# Patient Record
Sex: MALE | Race: WHITE | URBAN - METROPOLITAN AREA
[De-identification: names, ages, dates, MRNs, and addresses within clinical notes are randomized per-mention and may not be internally consistent; named-entity substitution may affect disease eponyms.]

---

## 2019-11-17 ENCOUNTER — EMERGENCY (EMERGENCY)
Facility: HOSPITAL | Age: 34
LOS: 1 days | Discharge: HOME | End: 2019-11-17
Admitting: EMERGENCY MEDICINE
Payer: COMMERCIAL

## 2019-11-17 VITALS
HEIGHT: 72 IN | HEART RATE: 89 BPM | TEMPERATURE: 98 F | DIASTOLIC BLOOD PRESSURE: 111 MMHG | SYSTOLIC BLOOD PRESSURE: 163 MMHG | OXYGEN SATURATION: 100 % | WEIGHT: 250 LBS | RESPIRATION RATE: 18 BRPM

## 2019-11-17 VITALS — DIASTOLIC BLOOD PRESSURE: 62 MMHG | SYSTOLIC BLOOD PRESSURE: 133 MMHG

## 2019-11-17 LAB
ALBUMIN SERPL ELPH-MCNC: 4 G/DL — SIGNIFICANT CHANGE UP (ref 3.4–5)
ALP SERPL-CCNC: 51 U/L — SIGNIFICANT CHANGE UP (ref 40–120)
ALT FLD-CCNC: 53 U/L — HIGH (ref 12–42)
ANION GAP SERPL CALC-SCNC: 10 MMOL/L — SIGNIFICANT CHANGE UP (ref 9–16)
AST SERPL-CCNC: 24 U/L — SIGNIFICANT CHANGE UP (ref 15–37)
BASOPHILS # BLD AUTO: 0.06 K/UL — SIGNIFICANT CHANGE UP (ref 0–0.2)
BASOPHILS NFR BLD AUTO: 0.8 % — SIGNIFICANT CHANGE UP (ref 0–2)
BILIRUB SERPL-MCNC: 0.5 MG/DL — SIGNIFICANT CHANGE UP (ref 0.2–1.2)
BUN SERPL-MCNC: 19 MG/DL — SIGNIFICANT CHANGE UP (ref 7–23)
CALCIUM SERPL-MCNC: 8.8 MG/DL — SIGNIFICANT CHANGE UP (ref 8.5–10.5)
CHLORIDE SERPL-SCNC: 105 MMOL/L — SIGNIFICANT CHANGE UP (ref 96–108)
CO2 SERPL-SCNC: 26 MMOL/L — SIGNIFICANT CHANGE UP (ref 22–31)
CREAT SERPL-MCNC: 1.09 MG/DL — SIGNIFICANT CHANGE UP (ref 0.5–1.3)
EOSINOPHIL # BLD AUTO: 0.01 K/UL — SIGNIFICANT CHANGE UP (ref 0–0.5)
EOSINOPHIL NFR BLD AUTO: 0.1 % — SIGNIFICANT CHANGE UP (ref 0–6)
GLUCOSE BLDC GLUCOMTR-MCNC: 83 MG/DL — SIGNIFICANT CHANGE UP (ref 70–99)
GLUCOSE SERPL-MCNC: 85 MG/DL — SIGNIFICANT CHANGE UP (ref 70–99)
HCT VFR BLD CALC: 42.5 % — SIGNIFICANT CHANGE UP (ref 39–50)
HGB BLD-MCNC: 15.2 G/DL — SIGNIFICANT CHANGE UP (ref 13–17)
IMM GRANULOCYTES NFR BLD AUTO: 0.7 % — SIGNIFICANT CHANGE UP (ref 0–1.5)
LYMPHOCYTES # BLD AUTO: 2.09 K/UL — SIGNIFICANT CHANGE UP (ref 1–3.3)
LYMPHOCYTES # BLD AUTO: 28.8 % — SIGNIFICANT CHANGE UP (ref 13–44)
MAGNESIUM SERPL-MCNC: 1.9 MG/DL — SIGNIFICANT CHANGE UP (ref 1.6–2.6)
MCHC RBC-ENTMCNC: 29.2 PG — SIGNIFICANT CHANGE UP (ref 27–34)
MCHC RBC-ENTMCNC: 35.8 GM/DL — SIGNIFICANT CHANGE UP (ref 32–36)
MCV RBC AUTO: 81.7 FL — SIGNIFICANT CHANGE UP (ref 80–100)
MONOCYTES # BLD AUTO: 0.82 K/UL — SIGNIFICANT CHANGE UP (ref 0–0.9)
MONOCYTES NFR BLD AUTO: 11.3 % — SIGNIFICANT CHANGE UP (ref 2–14)
NEUTROPHILS # BLD AUTO: 4.23 K/UL — SIGNIFICANT CHANGE UP (ref 1.8–7.4)
NEUTROPHILS NFR BLD AUTO: 58.3 % — SIGNIFICANT CHANGE UP (ref 43–77)
NRBC # BLD: 0 /100 WBCS — SIGNIFICANT CHANGE UP (ref 0–0)
PLATELET # BLD AUTO: 235 K/UL — SIGNIFICANT CHANGE UP (ref 150–400)
POTASSIUM SERPL-MCNC: 4.2 MMOL/L — SIGNIFICANT CHANGE UP (ref 3.5–5.3)
POTASSIUM SERPL-SCNC: 4.2 MMOL/L — SIGNIFICANT CHANGE UP (ref 3.5–5.3)
PROT SERPL-MCNC: 7.4 G/DL — SIGNIFICANT CHANGE UP (ref 6.4–8.2)
RBC # BLD: 5.2 M/UL — SIGNIFICANT CHANGE UP (ref 4.2–5.8)
RBC # FLD: 11.9 % — SIGNIFICANT CHANGE UP (ref 10.3–14.5)
SODIUM SERPL-SCNC: 141 MMOL/L — SIGNIFICANT CHANGE UP (ref 132–145)
WBC # BLD: 7.26 K/UL — SIGNIFICANT CHANGE UP (ref 3.8–10.5)
WBC # FLD AUTO: 7.26 K/UL — SIGNIFICANT CHANGE UP (ref 3.8–10.5)

## 2019-11-17 PROCEDURE — 99284 EMERGENCY DEPT VISIT MOD MDM: CPT

## 2019-11-17 PROCEDURE — 93010 ELECTROCARDIOGRAM REPORT: CPT

## 2019-11-17 RX ORDER — KETOROLAC TROMETHAMINE 30 MG/ML
30 SYRINGE (ML) INJECTION ONCE
Refills: 0 | Status: DISCONTINUED | OUTPATIENT
Start: 2019-11-17 | End: 2019-11-17

## 2019-11-17 RX ORDER — METOCLOPRAMIDE HCL 10 MG
10 TABLET ORAL ONCE
Refills: 0 | Status: COMPLETED | OUTPATIENT
Start: 2019-11-17 | End: 2019-11-17

## 2019-11-17 RX ORDER — SODIUM CHLORIDE 9 MG/ML
1000 INJECTION INTRAMUSCULAR; INTRAVENOUS; SUBCUTANEOUS ONCE
Refills: 0 | Status: COMPLETED | OUTPATIENT
Start: 2019-11-17 | End: 2019-11-17

## 2019-11-17 RX ORDER — HYDRALAZINE HCL 50 MG
10 TABLET ORAL ONCE
Refills: 0 | Status: COMPLETED | OUTPATIENT
Start: 2019-11-17 | End: 2019-11-17

## 2019-11-17 RX ADMIN — SODIUM CHLORIDE 1000 MILLILITER(S): 9 INJECTION INTRAMUSCULAR; INTRAVENOUS; SUBCUTANEOUS at 17:32

## 2019-11-17 RX ADMIN — Medication 10 MILLIGRAM(S): at 17:32

## 2019-11-17 RX ADMIN — Medication 30 MILLIGRAM(S): at 17:32

## 2019-11-17 NOTE — ED PROVIDER NOTE - OBJECTIVE STATEMENT
pt. denies any PMH, c/o several days of feeling foggy, Ha, pressure behind b/l eyes, + nausea, fatigue. Took 4oo mg of Motrin pre day with minimal improvement. Pt. admits to heavy drinking prior to sx, as well as MDMA use, lack of sleep,  ( which he has never taken before). Pt. otherwise denies any vision changes, no vomiting, no URs sx, no tinnitus, no fever/chills, no weakness,

## 2019-11-17 NOTE — ED PROVIDER NOTE - NSFOLLOWUPINSTRUCTIONS_ED_ALL_ED_FT
Migraine Headache  A migraine headache is an intense, throbbing pain on one side or both sides of the head. Migraines may also cause other symptoms, such as nausea, vomiting, and sensitivity to light and noise.  What are the causes?  Doing or taking certain things may also trigger migraines, such as:  Alcohol.Smoking.Medicines, such as:  Medicine used to treat chest pain (nitroglycerine).Birth control pills.Estrogen pills.Certain blood pressure medicines.Aged cheeses, chocolate, or caffeine.Foods or drinks that contain nitrates, glutamate, aspartame, or tyramine.Physical activity.Other things that may trigger a migraine include:  Menstruation.Pregnancy.Hunger.Stress, lack of sleep, too much sleep, or fatigue.Weather changes.What increases the risk?  The following factors may make you more likely to experience migraine headaches:  Age. Risk increases with age.Family history of migraine headaches.Being .Depression and anxiety.Obesity.Being a woman.Having a hole in the heart (patent foramen ovale) or other heart problems.What are the signs or symptoms?  The main symptom of this condition is pulsating or throbbing pain. Pain may:  Happen in any area of the head, such as on one side or both sides.Interfere with daily activities.Get worse with physical activity.Get worse with exposure to bright lights or loud noises.Other symptoms may include:  Nausea.Vomiting.Dizziness.General sensitivity to bright lights, loud noises, or smells.Before you get a migraine, you may get warning signs that a migraine is developing (aura). An aura may include:  Seeing flashing lights or having blind spots.Seeing bright spots, halos, or zigzag lines.Having tunnel vision or blurred vision.Having numbness or a tingling feeling.Having trouble talking.Having muscle weakness.How is this diagnosed?  A migraine headache can be diagnosed based on:  Your symptoms.A physical exam.Tests, such as CT scan or MRI of the head. These imaging tests can help rule out other causes of headaches.Taking fluid from the spine (lumbar puncture) and analyzing it (cerebrospinal fluid analysis, or CSF analysis).How is this treated?  A migraine headache is usually treated with medicines that:  Relieve pain.Relieve nausea.Prevent migraines from coming back.Treatment may also include:  Acupuncture.Lifestyle changes like avoiding foods that trigger migraines.Follow these instructions at home:  Medicines     Take over-the-counter and prescription medicines only as told by your health care provider.Do not drive or use heavy machinery while taking prescription pain medicine.To prevent or treat constipation while you are taking prescription pain medicine, your health care provider may recommend that you:  Drink enough fluid to keep your urine clear or pale yellow.Take over-the-counter or prescription medicines.Eat foods that are high in fiber, such as fresh fruits and vegetables, whole grains, and beans.Limit foods that are high in fat and processed sugars, such as fried and sweet foods.Lifestyle     Avoid alcohol use.Do not use any products that contain nicotine or tobacco, such as cigarettes and e-cigarettes. If you need help quitting, ask your health care provider.Get at least 8 hours of sleep every night.Limit your stress.General instructions     Image       Image   Keep a journal to find out what may trigger your migraine headaches. For example, write down:  What you eat and drink.How much sleep you get.Any change to your diet or medicines.If you have a migraine:  Avoid things that make your symptoms worse, such as bright lights.It may help to lie down in a dark, quiet room.Do not drive or use heavy machinery.Ask your health care provider what activities are safe for you while you are experiencing symptoms.Keep all follow-up visits as told by your health care provider. This is important.Contact a health care provider if:  You develop symptoms that are different or more severe than your usual migraine symptoms.Get help right away if:  Your migraine becomes severe.You have a fever.You have a stiff neck.You have vision loss.Your muscles feel weak or like you cannot control them.You start to lose your balance often.You develop trouble walking.You faint.This information is not intended to replace advice given to you by your health care provider. Make sure you discuss any questions you have with your health care provider.

## 2019-11-17 NOTE — ED PROVIDER NOTE - PATIENT PORTAL LINK FT
You can access the FollowMyHealth Patient Portal offered by [facility] by registering at the following website: [email or web address]. By joining [employer]’s FollowMyHealth portal, you will also be able to view your health information using other applications (apps) compatible with our system.

## 2019-11-17 NOTE — ED PROVIDER NOTE - CLINICAL SUMMARY MEDICAL DECISION MAKING FREE TEXT BOX
pt. denies any PMH, c/o several days of feeling foggy, Ha, pressure behind b/l eyes, + nausea, fatigue. Took 4oo mg of Motrin pre day with minimal improvement. Pt. admits to heavy drinking prior to sx, as well as MDMA use. vss, exam unremarkable, likely 2/2 ETOh./ MDMA use. labs nl, felt better after meds, stable for d/c,

## 2019-11-17 NOTE — ED PROVIDER NOTE - NSFOLLOWUPCLINICS_GEN_ALL_ED_FT
[facility]  Neurology  [address] [place] [address]  Phone: [phone]  Fax: [phone]  Follow Up Time:

## 2019-11-28 DIAGNOSIS — R51 HEADACHE: ICD-10-CM

## 2019-11-28 DIAGNOSIS — R53.83 OTHER FATIGUE: ICD-10-CM

## 2019-11-28 DIAGNOSIS — H53.149 VISUAL DISCOMFORT, UNSPECIFIED: ICD-10-CM

## 2019-11-28 DIAGNOSIS — R11.0 NAUSEA: ICD-10-CM

## 2019-11-28 DIAGNOSIS — R42 DIZZINESS AND GIDDINESS: ICD-10-CM
